# Patient Record
Sex: MALE | Race: WHITE | Employment: OTHER | URBAN - METROPOLITAN AREA
[De-identification: names, ages, dates, MRNs, and addresses within clinical notes are randomized per-mention and may not be internally consistent; named-entity substitution may affect disease eponyms.]

---

## 2021-10-11 ENCOUNTER — OFFICE VISIT (OUTPATIENT)
Dept: URGENT CARE | Facility: URGENT CARE | Age: 36
End: 2021-10-11
Payer: COMMERCIAL

## 2021-10-11 VITALS
HEART RATE: 90 BPM | TEMPERATURE: 98.5 F | DIASTOLIC BLOOD PRESSURE: 85 MMHG | WEIGHT: 167 LBS | SYSTOLIC BLOOD PRESSURE: 132 MMHG | OXYGEN SATURATION: 98 %

## 2021-10-11 DIAGNOSIS — J02.0 STREPTOCOCCAL SORE THROAT: Primary | ICD-10-CM

## 2021-10-11 LAB — DEPRECATED S PYO AG THROAT QL EIA: NEGATIVE

## 2021-10-11 PROCEDURE — 87651 STREP A DNA AMP PROBE: CPT

## 2021-10-11 PROCEDURE — 99203 OFFICE O/P NEW LOW 30 MIN: CPT

## 2021-10-11 RX ORDER — HUMAN INSULIN 100 [IU]/ML
4 INJECTION, SUSPENSION SUBCUTANEOUS
COMMUNITY
Start: 2021-08-18

## 2021-10-11 RX ORDER — NAPROXEN SODIUM 220 MG
TABLET ORAL
COMMUNITY
Start: 2020-09-15

## 2021-10-11 RX ORDER — FLASH GLUCOSE SENSOR
KIT MISCELLANEOUS
COMMUNITY
Start: 2020-07-22

## 2021-10-12 LAB — GROUP A STREP BY PCR: NOT DETECTED

## 2021-10-12 NOTE — PROGRESS NOTES
"SUBJECTIVE:  Tolu Jack is a 36 year old male with a chief complaint of sore throat.  Onset of symptoms was 2 week(s) ago.    Course of illness: gradual onset and improving.  Severity mild  Current and Associated symptoms:   Treatment measures tried include None tried.  Predisposing factors include ill contact: Family member .    History reviewed. No pertinent past medical history.  Current Outpatient Medications   Medication Sig Dispense Refill     Continuous Blood Gluc Sensor (FREESTYLE PRINCESS 14 DAY SENSOR) MISC CHANGE 1 SENSOR EVERY 14 DAYS       insulin aspart (NOVOLOG VIAL) 100 UNITS/ML vial 1 unit/ 10 gms carb, 30-45 units/day.  Indications: Diabetes Mellitus       insulin glargine (LANTUS SOLOSTAR) 100 UNIT/ML pen ADMINISTER 21 UNITS UNDER THE SKIN DAILY       insulin lispro (HUMALOG VIAL) 100 UNIT/ML vial        insulin NPH (NOVOLIN N VIAL) 100 UNIT/ML vial Inject 4 Units Subcutaneous       Insulin Syringe 30G X 5/16\" 0.3 ML MISC USE FOUR TO FIVE TIMES DAILY       History   Smoking Status     Never Smoker   Smokeless Tobacco     Never Used       ROS:  Review of systems negative except as stated above.    OBJECTIVE:   /85 (BP Location: Left arm, Patient Position: Sitting, Cuff Size: Adult Regular)   Pulse 90   Temp 98.5  F (36.9  C) (Oral)   Wt 75.8 kg (167 lb)   SpO2 98%   GENERAL APPEARANCE: healthy, alert and no distress  EYES: EOMI,  PERRL, conjunctiva clear  HENT: ear canals and TM's normal.  Nose normal.  Pharynx erythematous with some exudate noted.  NECK: supple, non-tender to palpation, no adenopathy noted  RESP: lungs clear to auscultation - no rales, rhonchi or wheezes  CV: regular rates and rhythm, normal S1 S2, no murmur noted  ABDOMEN:  soft, nontender, no HSM or masses and bowel sounds normal  SKIN: no suspicious lesions or rashes    Rapid Strep test is negative; await throat culture results.    ASSESSMENT:  Viral pharyngitis  PLAN:   Symptomatic treat with gargles, lozenges, and " OTC analgesic as needed.   Follow-up with primary care provider if not improving.